# Patient Record
Sex: MALE | Race: WHITE
[De-identification: names, ages, dates, MRNs, and addresses within clinical notes are randomized per-mention and may not be internally consistent; named-entity substitution may affect disease eponyms.]

---

## 2017-02-21 ENCOUNTER — HOSPITAL ENCOUNTER (OUTPATIENT)
Dept: HOSPITAL 61 - CT | Age: 62
Discharge: HOME | End: 2017-02-21
Attending: INTERNAL MEDICINE
Payer: COMMERCIAL

## 2017-02-21 DIAGNOSIS — L92.8: ICD-10-CM

## 2017-02-21 DIAGNOSIS — N28.1: ICD-10-CM

## 2017-02-21 DIAGNOSIS — I25.10: Primary | ICD-10-CM

## 2017-02-21 PROCEDURE — 71250 CT THORAX DX C-: CPT

## 2017-02-21 NOTE — RAD
EXAM: CT of the chest without intravenous contrast.



HISTORY: Persistent cough and shortness of breath.



TECHNIQUE: Computed tomography of the chest was performed without intravenous

contrast.



COMPARISON: None.



FINDINGS: Images of the upper abdomen reveal a cyst at the left renal upper

pole laterally measuring 5.9 x 4.9 cm. There are cortical scars along the

right kidney. Bone windows reveal no suspicious lesions.



There are no pathologically enlarged mediastinal or axillary lymph nodes.

There is no pleural or pericardial effusion. The heart is not enlarged. There

are atherosclerotic calcifications of the coronary arteries.



Lung windows reveal no infiltrates. There is a calcified granuloma in the

lingula.



IMPRESSION:

1. No infiltrates. No suspicious lesions.

2. Coronary atherosclerotic calcifications.



*One or more of the following individualized dose reduction techniques were

utilized for this examination:  

1. Automated exposure control.  

2. Adjustment of the mA and/or kV according to patient size.  

3. Use of iterative reconstruction technique.

## 2019-07-05 ENCOUNTER — HOSPITAL ENCOUNTER (OUTPATIENT)
Dept: HOSPITAL 61 - KCIC | Age: 64
Discharge: HOME | End: 2019-07-05
Attending: FAMILY MEDICINE
Payer: COMMERCIAL

## 2019-07-05 DIAGNOSIS — J20.9: Primary | ICD-10-CM

## 2019-07-05 PROCEDURE — 71046 X-RAY EXAM CHEST 2 VIEWS: CPT

## 2019-07-05 NOTE — [facility]
Chest radiograph 7/5/2019 12:00 AM

 

INDICATION: Bronchitis with bronchospasm. Productive cough for 2 weeks.

 

COMPARISON: CT chest February 21, 2017

 

TECHNIQUE: Frontal and lateral views of the chest are provided.

 

FINDINGS:

 

The cardiomediastinal silhouette is within normal limits.

 

There are no pleural effusions. There is no pulmonary vascular congestion.

There is no pneumothorax.

 

Bronchial wall thickening is suggestive of bronchitis. No focal airspace 

consolidation.

 

No significant osseous abnormality is identified.

 

IMPRESSION:

 

Findings are suggestive of bronchitis without definite focal airspace 

consolidation.

 

Electronically signed by: Deisy Montalvo MD (7/5/2019 3:56 PM) 

[id number]

## 2019-07-25 ENCOUNTER — HOSPITAL ENCOUNTER (OUTPATIENT)
Dept: HOSPITAL 61 - KCIC | Age: 64
Discharge: HOME | End: 2019-07-25
Attending: SPECIALIST
Payer: COMMERCIAL

## 2019-07-25 DIAGNOSIS — N20.0: Primary | ICD-10-CM

## 2019-07-25 PROCEDURE — 74018 RADEX ABDOMEN 1 VIEW: CPT

## 2019-07-26 NOTE — [facility]
EXAM: Supine AP view of the abdomen 

 

DATE: 7/25/2019 12:00 AM

 

INDICATION: Renal stones

 

COMPARISON: No Prior

 

FINDINGS:

 

Marked colonic stool content is seen. This obscures evaluation of the 

renal fossa submitted for evaluation for renal calculi is markedly 

limited. No evidence of bowel obstruction.

 

IMPRESSION:

1.  Evaluation for renal calculi is markedly limited given overlying large

volume stool.

 

Electronically signed by: Cody Medellin MD (7/26/2019 12:10 PM) [facility]